# Patient Record
(demographics unavailable — no encounter records)

---

## 2025-05-13 NOTE — PHYSICAL EXAM
[NL (0)] : extension 0 degrees [5___] : quadriceps 5[unfilled]/5 [Left] : left knee [AP] : anteroposterior [Lateral] : lateral [Bruning] : skyline [There are no fractures, subluxations or dislocations. No significant abnormalities are seen] : There are no fractures, subluxations or dislocations. No significant abnormalities are seen [Moderate tricompartmental OA medial narrowing] : Moderate tricompartmental OA medial narrowing [Moderate patellofemoral OA] : Moderate patellofemoral OA [] : non-antalgic [TWNoteComboBox7] : flexion 130 degrees

## 2025-05-13 NOTE — HISTORY OF PRESENT ILLNESS
[10] : 10 [6] : 6 [Dull/Aching] : dull/aching [Sharp] : sharp [Tightness] : tightness [Intermittent] : intermittent [Household chores] : household chores [Leisure] : leisure [Rest] : rest [Meds] : meds [Ice] : ice [Full time] : Work status: full time [] : Post Surgical Visit: no [FreeTextEntry9] : motrin

## 2025-05-13 NOTE — PROCEDURE
[Large Joint Injection] : Large joint injection [Left] : of the left [Knee] : knee [Pain] : pain [Betadine] : betadine [Ethyl Chloride sprayed topically] : ethyl chloride sprayed topically [Sterile technique used] : sterile technique used [___ cc    1%] : Lidocaine ~Vcc of 1%  [___ cc    40mg] : Methylprednisolone (Depomedrol) ~Vcc of 40 mg  [] : Patient tolerated procedure well [Apply ice for 15min out of every hour for the next 12-24 hours as tolerated] : apply ice for 15 minutes out of every hour for the next 12-24 hours as tolerated [Patient was advised to rest the joint(s) for ____ days] : patient was advised to rest the joint(s) for [unfilled] days [Risks, benefits, alternatives discussed / Verbal consent obtained] : the risks benefits, and alternatives have been discussed, and verbal consent was obtained

## 2025-05-13 NOTE — ASSESSMENT
[FreeTextEntry1] : diagnosis discussed with her; will try PT and continue with ice and Motrin. Discussed injection today, possible risks discussed including infection; she is a diabetic. We discussed possible elevation of her BS, she will monitor it. Also discussed possible MRI if symptoms persist.

## 2025-05-13 NOTE — REASON FOR VISIT
[FreeTextEntry2] : Patient is a 54 year old female with complaints of left knee pain for about 2 weeks. No fall or injury. Pain is worse when using stairs and standing. Increased pain when bending left knee.  Tried some ice and Motrin.  No c/o buckling or giving out.